# Patient Record
Sex: FEMALE | HISPANIC OR LATINO
[De-identification: names, ages, dates, MRNs, and addresses within clinical notes are randomized per-mention and may not be internally consistent; named-entity substitution may affect disease eponyms.]

---

## 2017-03-13 ENCOUNTER — CHARTING TRANS (OUTPATIENT)
Dept: OTHER | Age: 42
End: 2017-03-13

## 2017-03-21 ENCOUNTER — IMAGING SERVICES (OUTPATIENT)
Dept: OTHER | Age: 42
End: 2017-03-21

## 2017-03-21 ENCOUNTER — CHARTING TRANS (OUTPATIENT)
Dept: OTHER | Age: 42
End: 2017-03-21

## 2017-03-21 ENCOUNTER — LAB SERVICES (OUTPATIENT)
Dept: OTHER | Age: 42
End: 2017-03-21

## 2017-03-21 ENCOUNTER — HOSPITAL (OUTPATIENT)
Dept: OTHER | Age: 42
End: 2017-03-21
Attending: INTERNAL MEDICINE

## 2017-03-21 ASSESSMENT — PAIN SCALES - GENERAL: PAINLEVEL_OUTOF10: 0

## 2017-03-22 ENCOUNTER — CHARTING TRANS (OUTPATIENT)
Dept: OTHER | Age: 42
End: 2017-03-22

## 2017-03-24 ENCOUNTER — CHARTING TRANS (OUTPATIENT)
Dept: OTHER | Age: 42
End: 2017-03-24

## 2017-03-24 LAB — PAP WITH HIGH RISK HPV: NORMAL

## 2017-03-31 ENCOUNTER — LAB SERVICES (OUTPATIENT)
Dept: OTHER | Age: 42
End: 2017-03-31

## 2017-04-05 ENCOUNTER — CHARTING TRANS (OUTPATIENT)
Dept: OTHER | Age: 42
End: 2017-04-05

## 2017-04-05 LAB
ALBUMIN SERPL-MCNC: 4.1 G/DL (ref 3.6–5.1)
ALBUMIN/GLOB SERPL: 1.2 (ref 1–2.4)
ALP SERPL-CCNC: 80 UNITS/L (ref 45–117)
ALT SERPL-CCNC: 23 UNITS/L
ANION GAP SERPL CALC-SCNC: 15 MMOL/L (ref 10–20)
AST SERPL-CCNC: 17 UNITS/L
BASOPHILS # BLD: 0 K/MCL (ref 0–0.3)
BASOPHILS NFR BLD: 1 %
BILIRUB SERPL-MCNC: 0.3 MG/DL (ref 0.2–1)
BUN SERPL-MCNC: 18 MG/DL (ref 6–20)
BUN/CREAT SERPL: 29 (ref 7–25)
C TRACH RRNA SPEC QL NAA+PROBE: NEGATIVE
CALCIUM SERPL-MCNC: 8.8 MG/DL (ref 8.4–10.2)
CHLORIDE SERPL-SCNC: 108 MMOL/L (ref 98–107)
CHOLEST SERPL-MCNC: 167 MG/DL
CHOLEST/HDLC SERPL: 2.8
CO2 SERPL-SCNC: 24 MMOL/L (ref 21–32)
CREAT SERPL-MCNC: 0.62 MG/DL (ref 0.51–0.95)
DIFFERENTIAL METHOD BLD: ABNORMAL
EOSINOPHIL # BLD: 0.2 K/MCL (ref 0.1–0.5)
EOSINOPHIL NFR BLD: 4 %
ERYTHROCYTE [DISTWIDTH] IN BLOOD: 13.2 % (ref 11–15)
GLOBULIN SER-MCNC: 3.5 G/DL (ref 2–4)
GLUCOSE SERPL-MCNC: 66 MG/DL (ref 65–99)
HDLC SERPL-MCNC: 60 MG/DL
HEMATOCRIT: 39.7 % (ref 36–46.5)
HEMOGLOBIN: 12.8 G/DL (ref 12–15.5)
HIV 1+2 AB+HIV1 P24 AG SERPL QL IA: NONREACTIVE
LDLC SERPL CALC-MCNC: 90 MG/DL
LENGTH OF FAST TIME PATIENT: ABNORMAL HRS
LENGTH OF FAST TIME PATIENT: ABNORMAL HRS
LYMPHOCYTES # BLD: 1.5 K/MCL (ref 1–4.8)
LYMPHOCYTES NFR BLD: 26 %
MEAN CORPUSCULAR HEMOGLOBIN: 29.6 PG (ref 26–34)
MEAN CORPUSCULAR HGB CONC: 32.2 G/DL (ref 32–36.5)
MEAN CORPUSCULAR VOLUME: 91.9 FL (ref 78–100)
MONOCYTES # BLD: 0.5 K/MCL (ref 0.3–0.9)
MONOCYTES NFR BLD: 9 %
N GONORRHOEA RRNA SPEC QL NAA+PROBE: NEGATIVE
NEUTROPHILS # BLD: 3.7 K/MCL (ref 1.8–7.7)
NEUTROPHILS NFR BLD: 60 %
NONHDLC SERPL-MCNC: 107 MG/DL
PLATELET COUNT: 135 K/MCL (ref 140–450)
POTASSIUM SERPL-SCNC: 4.3 MMOL/L (ref 3.4–5.1)
RED CELL COUNT: 4.32 MIL/MCL (ref 4–5.2)
RPR SER QL: NONREACTIVE
SODIUM SERPL-SCNC: 143 MMOL/L (ref 135–145)
SPECIMEN SOURCE: NORMAL
TOTAL PROTEIN: 7.6 G/DL (ref 6.4–8.2)
TRIGL SERPL-MCNC: 83 MG/DL
TSH SERPL-ACNC: 2.17 MCUNITS/ML (ref 0.35–5)
WHITE BLOOD COUNT: 6 K/MCL (ref 4.2–11)

## 2018-04-12 ENCOUNTER — CHARTING TRANS (OUTPATIENT)
Dept: OTHER | Age: 43
End: 2018-04-12

## 2018-04-12 ENCOUNTER — IMAGING SERVICES (OUTPATIENT)
Dept: OTHER | Age: 43
End: 2018-04-12

## 2018-04-12 ENCOUNTER — HOSPITAL (OUTPATIENT)
Dept: OTHER | Age: 43
End: 2018-04-12
Attending: INTERNAL MEDICINE

## 2018-04-25 ENCOUNTER — HOSPITAL (OUTPATIENT)
Dept: OTHER | Age: 43
End: 2018-04-25
Attending: INTERNAL MEDICINE

## 2018-04-25 ENCOUNTER — IMAGING SERVICES (OUTPATIENT)
Dept: OTHER | Age: 43
End: 2018-04-25

## 2018-11-05 VITALS
HEIGHT: 60 IN | DIASTOLIC BLOOD PRESSURE: 70 MMHG | OXYGEN SATURATION: 100 % | HEART RATE: 75 BPM | WEIGHT: 146.83 LBS | TEMPERATURE: 97.5 F | SYSTOLIC BLOOD PRESSURE: 104 MMHG | RESPIRATION RATE: 16 BRPM | BODY MASS INDEX: 28.83 KG/M2

## 2018-11-05 VITALS
SYSTOLIC BLOOD PRESSURE: 120 MMHG | DIASTOLIC BLOOD PRESSURE: 82 MMHG | BODY MASS INDEX: 29 KG/M2 | HEART RATE: 76 BPM | RESPIRATION RATE: 16 BRPM | HEIGHT: 60 IN | OXYGEN SATURATION: 97 % | TEMPERATURE: 97.7 F | WEIGHT: 147.71 LBS

## 2023-07-10 ENCOUNTER — APPOINTMENT (OUTPATIENT)
Dept: CT IMAGING | Facility: HOSPITAL | Age: 48
End: 2023-07-10
Attending: NURSE PRACTITIONER
Payer: COMMERCIAL

## 2023-07-10 ENCOUNTER — HOSPITAL ENCOUNTER (EMERGENCY)
Facility: HOSPITAL | Age: 48
Discharge: HOME | End: 2023-07-11
Payer: COMMERCIAL

## 2023-07-10 DIAGNOSIS — G44.209 ACUTE NON INTRACTABLE TENSION-TYPE HEADACHE: Primary | ICD-10-CM

## 2023-07-10 DIAGNOSIS — H60.501 ACUTE OTITIS EXTERNA OF RIGHT EAR, UNSPECIFIED TYPE: ICD-10-CM

## 2023-07-10 LAB
ANION GAP SERPL CALC-SCNC: 8 MMOL/L (ref 0–18)
BASOPHILS # BLD AUTO: 0.04 X10(3) UL (ref 0–0.2)
BASOPHILS NFR BLD AUTO: 0.4 %
BUN BLD-MCNC: 13 MG/DL (ref 7–18)
BUN/CREAT SERPL: 16.5 (ref 10–20)
CALCIUM BLD-MCNC: 9.6 MG/DL (ref 8.5–10.1)
CHLORIDE SERPL-SCNC: 107 MMOL/L (ref 98–112)
CO2 SERPL-SCNC: 27 MMOL/L (ref 21–32)
CREAT BLD-MCNC: 0.79 MG/DL
DEPRECATED RDW RBC AUTO: 43.6 FL (ref 35.1–46.3)
EOSINOPHIL # BLD AUTO: 0.24 X10(3) UL (ref 0–0.7)
EOSINOPHIL NFR BLD AUTO: 2.1 %
ERYTHROCYTE [DISTWIDTH] IN BLOOD BY AUTOMATED COUNT: 13 % (ref 11–15)
GFR SERPLBLD BASED ON 1.73 SQ M-ARVRAT: 92 ML/MIN/1.73M2 (ref 60–?)
GLUCOSE BLD-MCNC: 89 MG/DL (ref 70–99)
HCT VFR BLD AUTO: 42.5 %
HGB BLD-MCNC: 13.9 G/DL
IMM GRANULOCYTES # BLD AUTO: 0.05 X10(3) UL (ref 0–1)
IMM GRANULOCYTES NFR BLD: 0.4 %
LYMPHOCYTES # BLD AUTO: 1.9 X10(3) UL (ref 1–4)
LYMPHOCYTES NFR BLD AUTO: 16.9 %
MCH RBC QN AUTO: 30.1 PG (ref 26–34)
MCHC RBC AUTO-ENTMCNC: 32.7 G/DL (ref 31–37)
MCV RBC AUTO: 92 FL
MONOCYTES # BLD AUTO: 0.79 X10(3) UL (ref 0.1–1)
MONOCYTES NFR BLD AUTO: 7 %
NEUTROPHILS # BLD AUTO: 8.23 X10 (3) UL (ref 1.5–7.7)
NEUTROPHILS # BLD AUTO: 8.23 X10(3) UL (ref 1.5–7.7)
NEUTROPHILS NFR BLD AUTO: 73.2 %
OSMOLALITY SERPL CALC.SUM OF ELEC: 294 MOSM/KG (ref 275–295)
PLATELET # BLD AUTO: 167 10(3)UL (ref 150–450)
POTASSIUM SERPL-SCNC: 4.1 MMOL/L (ref 3.5–5.1)
RBC # BLD AUTO: 4.62 X10(6)UL
SODIUM SERPL-SCNC: 142 MMOL/L (ref 136–145)
WBC # BLD AUTO: 11.3 X10(3) UL (ref 4–11)

## 2023-07-10 PROCEDURE — 96375 TX/PRO/DX INJ NEW DRUG ADDON: CPT

## 2023-07-10 PROCEDURE — 99284 EMERGENCY DEPT VISIT MOD MDM: CPT

## 2023-07-10 PROCEDURE — 96374 THER/PROPH/DIAG INJ IV PUSH: CPT

## 2023-07-10 PROCEDURE — 96361 HYDRATE IV INFUSION ADD-ON: CPT

## 2023-07-10 PROCEDURE — 80048 BASIC METABOLIC PNL TOTAL CA: CPT | Performed by: NURSE PRACTITIONER

## 2023-07-10 PROCEDURE — 85025 COMPLETE CBC W/AUTO DIFF WBC: CPT | Performed by: NURSE PRACTITIONER

## 2023-07-10 RX ORDER — METOCLOPRAMIDE HYDROCHLORIDE 5 MG/ML
10 INJECTION INTRAMUSCULAR; INTRAVENOUS ONCE
Status: COMPLETED | OUTPATIENT
Start: 2023-07-10 | End: 2023-07-10

## 2023-07-10 RX ORDER — KETOROLAC TROMETHAMINE 15 MG/ML
15 INJECTION, SOLUTION INTRAMUSCULAR; INTRAVENOUS ONCE
Status: COMPLETED | OUTPATIENT
Start: 2023-07-10 | End: 2023-07-10

## 2023-07-10 RX ORDER — DIPHENHYDRAMINE HYDROCHLORIDE 50 MG/ML
25 INJECTION INTRAMUSCULAR; INTRAVENOUS ONCE
Status: COMPLETED | OUTPATIENT
Start: 2023-07-10 | End: 2023-07-10

## 2023-07-11 ENCOUNTER — APPOINTMENT (OUTPATIENT)
Dept: CT IMAGING | Facility: HOSPITAL | Age: 48
End: 2023-07-11
Attending: NURSE PRACTITIONER
Payer: COMMERCIAL

## 2023-07-11 VITALS
BODY MASS INDEX: 28.86 KG/M2 | HEART RATE: 92 BPM | OXYGEN SATURATION: 100 % | TEMPERATURE: 98 F | WEIGHT: 147 LBS | HEIGHT: 60 IN | DIASTOLIC BLOOD PRESSURE: 69 MMHG | RESPIRATION RATE: 17 BRPM | SYSTOLIC BLOOD PRESSURE: 103 MMHG

## 2023-07-11 PROCEDURE — 70450 CT HEAD/BRAIN W/O DYE: CPT | Performed by: NURSE PRACTITIONER

## 2023-07-11 RX ORDER — CIPROFLOXACIN AND DEXAMETHASONE 3; 1 MG/ML; MG/ML
4 SUSPENSION/ DROPS AURICULAR (OTIC) 2 TIMES DAILY
Qty: 7.5 ML | Refills: 0 | Status: SHIPPED | OUTPATIENT
Start: 2023-07-11

## 2023-07-11 NOTE — ED INITIAL ASSESSMENT (HPI)
Pt reports last Thursday waking up with right sided face pain. Pt reports developing right sided neck pain on Friday. pt reports cough on Saturday. Pt reports hearing loss to right ear on Sunday. Pt reports intermittent sharp, stabbing pain to right ear and radiates up to right side of head.

## 2023-07-11 NOTE — ED QUICK NOTES
Patient resting on cart, non-labored respirations, endorses waves of sharp pains to right ear.  +subjective fevers, no ear drainage

## 2024-03-11 ENCOUNTER — APPOINTMENT (OUTPATIENT)
Dept: GENERAL RADIOLOGY | Age: 49
End: 2024-03-11
Attending: PHYSICIAN ASSISTANT
Payer: COMMERCIAL

## 2024-03-11 ENCOUNTER — HOSPITAL ENCOUNTER (OUTPATIENT)
Age: 49
Discharge: HOME | End: 2024-03-11
Payer: COMMERCIAL

## 2024-03-11 VITALS
DIASTOLIC BLOOD PRESSURE: 75 MMHG | HEART RATE: 90 BPM | OXYGEN SATURATION: 100 % | TEMPERATURE: 98 F | RESPIRATION RATE: 16 BRPM | SYSTOLIC BLOOD PRESSURE: 108 MMHG

## 2024-03-11 DIAGNOSIS — L02.212 ABSCESS OF BACK: ICD-10-CM

## 2024-03-11 DIAGNOSIS — M25.562 ARTHRALGIA OF LEFT LOWER LEG: Primary | ICD-10-CM

## 2024-03-11 PROCEDURE — 99213 OFFICE O/P EST LOW 20 MIN: CPT | Performed by: PHYSICIAN ASSISTANT

## 2024-03-11 PROCEDURE — 73562 X-RAY EXAM OF KNEE 3: CPT | Performed by: PHYSICIAN ASSISTANT

## 2024-03-11 RX ORDER — LIDOCAINE HYDROCHLORIDE 10 MG/ML
5 INJECTION, SOLUTION EPIDURAL; INFILTRATION; INTRACAUDAL; PERINEURAL ONCE
Status: DISCONTINUED | OUTPATIENT
Start: 2024-03-11 | End: 2024-03-11

## 2024-03-11 RX ORDER — LIDOCAINE HYDROCHLORIDE AND EPINEPHRINE 10; 10 MG/ML; UG/ML
1 INJECTION, SOLUTION INFILTRATION; PERINEURAL ONCE
Status: COMPLETED | OUTPATIENT
Start: 2024-03-11 | End: 2024-03-11

## 2024-03-11 RX ORDER — SULFAMETHOXAZOLE AND TRIMETHOPRIM 800; 160 MG/1; MG/1
1 TABLET ORAL 2 TIMES DAILY
Qty: 14 TABLET | Refills: 0 | Status: SHIPPED | OUTPATIENT
Start: 2024-03-11 | End: 2024-03-18

## 2024-03-11 NOTE — ED PROVIDER NOTES
Patient Seen in: [facility]      History     Chief Complaint   Patient presents with    Abscess    Knee Pain     Stated Complaint: rib pain, knee pain    Subjective:   HPI    Patient is a healthy 48-year-old female that presents to immediate care due to painful lesion on her torso worsening over the past week.  Patient states that she noticed a \"bump\" over her right back 1 week ago.  States that he she has had increased pain and swelling of the area.  Notes increased overlying erythema and pain which prompted patient to come to immediate care for further evaluation.  Patient also notes that she has left knee pain.  States that she was recently moving and believes that she twisted her knee.  Pain worse with range of motion and ambulation.  Denies treatment prior to arrival.    Objective:   History reviewed. No pertinent past medical history.           History reviewed. No pertinent surgical history.             Social History     Socioeconomic History    Marital status: Single   Tobacco Use    Smoking status: Never    Smokeless tobacco: Never   Vaping Use    Vaping Use: Never used   Substance and Sexual Activity    Alcohol use: Never    Drug use: Never              Review of Systems    Positive for stated complaint: rib pain, knee pain  Other systems are as noted in HPI.  Constitutional and vital signs reviewed.      All other systems reviewed and negative except as noted above.    Physical Exam     ED Triage Vitals [03/11/24 1722]   /75   Pulse 90   Resp 16   Temp 98.3 °F (36.8 °C)   Temp src Oral   SpO2 100 %   O2 Device None (Room air)       Current:/75   Pulse 90   Temp 98.3 °F (36.8 °C) (Oral)   Resp 16   SpO2 100%         Physical Exam    Vital signs reviewed. Nursing note reviewed.  Constitutional: Well-developed. Well-nourished. In no acute distress  HENT: Mucous membranes moist.   EYES: No scleral icterus or conjunctival injection.  NECK: Full ROM. Supple.   CARDIAC: Normal  rate. Normal S1/ S2. 2+ distal pulses. No edema  PULM/CHEST: Clear to auscultation bilaterally. No wheezes  Extremities: Full ROM of left knee.  Increased pain with flexion.  Point tenderness to palpation of left meniscus of left knee.  No overlying edema ecchymosis lacerations warmth erythema.  NEURO: Awake, alert, following commands, moving extremities, answering questions.   SKIN: Warm and dry.  3 cm circular fluctuant mass of right lumbar back.  Mild tenderness, overlying warmth erythema.  No active bleeding discharge surrounding erythema.  PSYCH: Normal judgment. Normal affect.               MDM      Patient is a healthy 48-year-old female that presents to immediate care due to painful lesion of right lower back worsening over the past week and left knee pain over the past few days.  Patient arrives with stable vitals, sitting comfortably.  Physical exam showing point tenderness ovation of left meniscus of left knee with increased pain with flexion of left knee.  Most likely meniscus injury however will obtain extremities to evaluate for acute knee fracture, dislocation.  Additionally, patient presents with fluctuant 3 cm circular mass to  right lower back.  Most likely cyst versus abscess, anesthesia was obtained using 1% with epi lidocaine.  Skin was cleaned with iodine.  Incision performed, simple incision, using 11 blade.  Scant purulent drainage was removed.  Patient tolerated procedure well, noting significant improvement in pain, with no immediate complications.  Due to faint surrounding erythema plan to discharge home with Bactrim for possible secondary cellulitis.  History given by patient.        ED Course   Labs Reviewed - No data to display     6:24 PM  X-ray images discussed with patient showing small suprapatellar joint effusion.  Discussed patient possible meniscus injury, will provide orthopedic referral at time of discharge.  Will additionally provide general surgery referral if abscess  persists or worsens.  Patient also requesting PCP referral stating that she does not currently have 1.  Will provide patient PCP referral at time of discharge.  Return precautions including worsening pain, increased erythema pain.  Patient agreeable to plan.                                  Medical Decision Making      Disposition and Plan     Clinical Impression:  1. Arthralgia of left lower leg    2. Abscess of back         Disposition:  Discharge  3/11/2024  6:14 pm    Follow-up:  Olaf Erazo MD  [address] [facility]  [address]  [facility] [address] [phone]    Schedule an appointment as soon as possible for a visit   for your back abscess    Bruce Trotter MD  [address] [facility]  [address]  [facility] [address] [phone]    Schedule an appointment as soon as possible for a visit   in your knee continues to hurt    Joie Ayala MD  [address] [facility]  [address]  [place] [address] [phone]    Schedule an appointment as soon as possible for a visit   for a primary care doctor          Medications Prescribed:  Discharge Medication List as of 3/11/2024  6:15 PM        START taking these medications    Details   sulfamethoxazole-trimethoprim -160 MG Oral Tab per tablet Take 1 tablet by mouth 2 (two) times daily for 7 days., Normal, Disp-14 tablet, R-0

## 2024-03-11 NOTE — ED INITIAL ASSESSMENT (HPI)
Pt with a area of swelling to her right flank. Began as a small bump x3 weeks ago, increased in size over past week. Approx 1\" round. Denies drainage, fever, trauma. Painful to touch.     Pt with c/o left knee since Saturday after moving boxes. Able to ambulate but c/o pain with movement. Denies specific incident that caused the pain.

## 2024-06-07 ENCOUNTER — HOSPITAL ENCOUNTER (OUTPATIENT)
Age: 49
Discharge: HOME | End: 2024-06-07
Payer: COMMERCIAL

## 2024-06-07 VITALS
TEMPERATURE: 98 F | OXYGEN SATURATION: 99 % | SYSTOLIC BLOOD PRESSURE: 129 MMHG | DIASTOLIC BLOOD PRESSURE: 91 MMHG | HEART RATE: 85 BPM | RESPIRATION RATE: 18 BRPM

## 2024-06-07 DIAGNOSIS — L02.91 ABSCESS: Primary | ICD-10-CM

## 2024-06-07 PROCEDURE — 99213 OFFICE O/P EST LOW 20 MIN: CPT | Performed by: NURSE PRACTITIONER

## 2024-06-07 RX ORDER — DOXYCYCLINE HYCLATE 100 MG/1
100 CAPSULE ORAL 2 TIMES DAILY
Qty: 14 CAPSULE | Refills: 0 | Status: SHIPPED | OUTPATIENT
Start: 2024-06-07 | End: 2024-06-14

## 2024-06-07 NOTE — ED PROVIDER NOTES
Patient Seen in: [facility] [place]    History   CC: abscesses   HPI: Sheryl Maloney 49 year old female  who presents c/o multiple clustered upper back abscesses.  States a few months ago had a similar lesion that was incised and drained with improvement however states in the last couple weeks has had a few of the same lesions abrupt. States her largest one open yesterday spontaneously and has been draining. Denies fever. Denies lesions elsewhere.     No past medical history on file.    No past surgical history on file.    No family history on file.    Social History     Socioeconomic History    Marital status: Single   Tobacco Use    Smoking status: Never    Smokeless tobacco: Never   Vaping Use    Vaping status: Never Used   Substance and Sexual Activity    Alcohol use: Never    Drug use: Never     Social Determinants of Health     Food Insecurity: Low Risk  (2/8/2023)    Received from [facility]    Food Insecurity     Have there been times that your food ran out, and you didn't have money to get more?: No     Are there times that you worry that this might happen?: No   Transportation Needs: Low Risk  (2/8/2023)    Received from [facility]    Transportation Needs     Do you have trouble getting transportation to medical appointments?: No       ROS:  Review of Systems    Positive for stated complaint: Right Arm Pain  Other systems are as noted in HPI.  Constitutional and vital signs reviewed.      All other systems reviewed and negative except as noted above.    PSFH elements reviewed from today and agreed except as otherwise stated in HPI.             Constitutional and vital signs reviewed.        Physical Exam     ED Triage Vitals [06/07/24 0807]   BP (!) 129/91   Pulse 85   Resp 18   Temp 98 °F (36.7 °C)   Temp src Temporal   SpO2 99 %   O2 Device None (Room air)       Current:BP (!) 129/91    Pulse 85   Temp 98 °F (36.7 °C) (Temporal)   Resp 18   SpO2 99%         PE:  General - Appears well, non-toxic and in NAD  Head - Appears symmetrical without deformity/swelling cranium, scalp, or facial bones  Eyes -  sclera not injected, no discharge noted, no periorbital edema  Skin - +3 small non-fluctuant lesions noted to right upper back just posterior to axilla without active dx. +3 small non fluctuant lesions noted to left upper back just posterior to axilla without dx. No surrounding erythema or edema associated.  Skin is otherwise pink warm and dry throughout, mmm, cap refill <2seconds  Neuro - A&O x4, sensation equal to both medial and lateral aspects of extremities, steady gait  MSK - makes purposeful movements of all extremities, radial pulses 2+ bilat.  Psych - Interactive and appropriate      ED Course   Labs Reviewed - No data to display    MDM     DDx: Abscess versus cellulitis versus cyst    We will treat  or abscess with doxycycline.  Warm/moist compresses or soaks reviewed, Tylenol or Motrin as needed for discomfort, follow-up and return/ED precautions reviewed.  Patient is historian and demonstrates understanding of all instruction and agrees with plan of care.      Disposition and Plan     Clinical Impression:  1. Abscess        Disposition:  Discharge    Follow-up:  Jimbo Macias MD  [address] [facility]  [address]  [facility] [address] [phone]    Go in 3 days  As needed    Katt Sun MD  [address] [place] [address] [phone]    Go in 3 days  As needed      Medications Prescribed:  Discharge Medication List as of 6/7/2024  8:15 AM        START taking these medications    Details   doxycycline 100 MG Oral Cap Take 1 capsule (100 mg total) by mouth 2 (two) times daily for 7 days., Normal, Disp-14 capsule, R-0

## 2024-06-07 NOTE — ED INITIAL ASSESSMENT (HPI)
Abscess on the  right upper arm 2 weeks ago with swelling.  Today while in shower noted that \"it open up'.

## 2024-06-07 NOTE — DISCHARGE INSTRUCTIONS
Take the full course of antibiotics, even if you start to feel better. Make an appointment to follow up with your primary care doctor dermatologist as provided. Use warm soaks/moist compresses and massage to drain area further. Good hand washing is important and anything that drains from the abscess should be considered contagious. Seek additional care in the emergency department if you have increasing redness, warmth or tenderness around your wound, fever > 100.4, nausea/vomiting or diarrhea, or for any other concerns.  
no

## 2024-10-31 ENCOUNTER — APPOINTMENT (OUTPATIENT)
Dept: GENERAL RADIOLOGY | Age: 49
End: 2024-10-31
Attending: Physician Assistant
Payer: COMMERCIAL

## 2024-10-31 ENCOUNTER — HOSPITAL ENCOUNTER (OUTPATIENT)
Age: 49
Discharge: HOME | End: 2024-10-31
Payer: COMMERCIAL

## 2024-10-31 VITALS
TEMPERATURE: 98 F | DIASTOLIC BLOOD PRESSURE: 57 MMHG | RESPIRATION RATE: 18 BRPM | OXYGEN SATURATION: 99 % | SYSTOLIC BLOOD PRESSURE: 114 MMHG | HEART RATE: 96 BPM

## 2024-10-31 DIAGNOSIS — M25.551 RIGHT HIP PAIN: ICD-10-CM

## 2024-10-31 DIAGNOSIS — M54.40 BACK PAIN OF LUMBAR REGION WITH SCIATICA: Primary | ICD-10-CM

## 2024-10-31 PROCEDURE — 99214 OFFICE O/P EST MOD 30 MIN: CPT | Performed by: PHYSICIAN ASSISTANT

## 2024-10-31 PROCEDURE — 96372 THER/PROPH/DIAG INJ SC/IM: CPT | Performed by: PHYSICIAN ASSISTANT

## 2024-10-31 PROCEDURE — 72100 X-RAY EXAM L-S SPINE 2/3 VWS: CPT | Performed by: PHYSICIAN ASSISTANT

## 2024-10-31 PROCEDURE — 73502 X-RAY EXAM HIP UNI 2-3 VIEWS: CPT | Performed by: PHYSICIAN ASSISTANT

## 2024-10-31 RX ORDER — METHYLPREDNISOLONE 4 MG/1
TABLET ORAL
Qty: 1 EACH | Refills: 0 | Status: SHIPPED | OUTPATIENT
Start: 2024-10-31

## 2024-10-31 RX ORDER — KETOROLAC TROMETHAMINE 30 MG/ML
30 INJECTION, SOLUTION INTRAMUSCULAR; INTRAVENOUS ONCE
Status: COMPLETED | OUTPATIENT
Start: 2024-10-31 | End: 2024-10-31

## 2024-10-31 RX ORDER — CYCLOBENZAPRINE HCL 5 MG
5 TABLET ORAL 3 TIMES DAILY PRN
Qty: 12 TABLET | Refills: 0 | Status: SHIPPED | OUTPATIENT
Start: 2024-10-31

## 2024-10-31 RX ORDER — LIDOCAINE 50 MG/G
1 PATCH TOPICAL EVERY 24 HOURS
Qty: 15 PATCH | Refills: 0 | Status: SHIPPED | OUTPATIENT
Start: 2024-10-31 | End: 2024-11-15

## 2024-10-31 NOTE — ED INITIAL ASSESSMENT (HPI)
Pt states she hit her right shin on the open door of a .  +pain from the right hip down to the knee.  Pt heard 3 pops.

## 2024-10-31 NOTE — DISCHARGE INSTRUCTIONS
Alternate ice / heat as tolerated  Get plenty of rest and drink plenty of water   Complete entire steroid pack as directed   Cyclobenzaprine (muscle relaxer) for spasms as needed. DO NOT drive or operate machinery after taking   Apply lidocaine patch directly to skin as needed for pain. You may leave on for up to 12 hours at a time and then 12 hours off at a time  Avoid excessive twisting / bending / lifting  Expect symptoms to start improving over next few days    Follow up with your primary care provider and/or ortho

## 2024-10-31 NOTE — ED PROVIDER NOTES
Chief Complaint   Patient presents with    Hip Pain       History obtained from: patient   services not used     HPI:     Sheryl Maloney is a 49 year old female who presents with lower back and right hip pain following injury this morning around 5:30 AM.  Patient states she tripped on open  at home. Patient states she hit her right shin on  door then immediately felt pain in her lower back and right hip. Patient states she heard \"crack\" in hip. Patient endorses pain in lower back, right hip, and radiating down posterior right upper leg.  Patient states she has no pain in knee or shin.  No medications taken or treatments tried.  Denies fall, head injury, neck pain, numbness, weakness, bowel/bladder dysfunction, saddle anesthesia, wound.    PMH  History reviewed. No pertinent past medical history.    [facility] and [facility].  Nurses notes reviewed I agree with documentation.    History reviewed. No pertinent family history.  Family history reviewed with patient/caregiver and is not pertinent to presenting problem.  Social History     Socioeconomic History    Marital status: Single     Spouse name: Not on file    Number of children: Not on file    Years of education: Not on file    Highest education level: Not on file   Occupational History    Not on file   Tobacco Use    Smoking status: Never    Smokeless tobacco: Never   Vaping Use    Vaping status: Never Used   Substance and Sexual Activity    Alcohol use: Never    Drug use: Never    Sexual activity: Not on file   Other Topics Concern    Not on file   Social History Narrative    Not on file     Social Drivers of Health     Financial Resource Strain: Not on file   Food Insecurity: Low Risk  (2/8/2023)    [facility]    Food Insecurity     Have there been times that your food ran out, and you didn't have money to get more?: No     Are there times  that you worry that this might happen?: No   Transportation Needs: Low Risk  (2/8/2023)    [facility]    Transportation Needs     Do you have trouble getting transportation to medical appointments?: No     How do you normally get to and from your appointments?: Not on file   Physical Activity: Not on file   Stress: Not on file   Social Connections: Not on file   Housing Stability: Not on file (2/8/2023)         ROS:   Positive for stated complaint: low back pain, right hip pain   All other systems reviewed and negative except as noted above.  Constitutional and Vital Signs Reviewed.    Physical Exam:     Findings:    /57   Pulse 96   Temp 97.8 °F (36.6 °C) (Temporal)   Resp 18   SpO2 99%   GENERAL: well developed, no acute distress, non-toxic appearing   SKIN: good skin turgor, no obvious rashes  HEAD: normocephalic, atraumatic  EYES: sclera non-icteric bilaterally, conjunctiva clear bilaterally  OROPHARYNX: MMM, maintaining airway and secretions  NECK: no nuchal rigidity, no trismus, no edema, phonation normal    CARDIO: tachycardic, regular rhythm, DP pulse 2+ bilaterally, cap refill < 2 sec   LUNGS: no increased WOB  GI: abdomen soft and non-tender   BACK: Right paraspinal tenderness to lumbar region and into right buttock, no midline tenderness to spine, no step-offs or deformities to spine, skin intact without overlying changes  EXTREMITIES: Tenderness to posterior, lateral, and anterior right hip, no obvious swelling or deformity, ROM somewhat limited due to pain, no tenderness to remainder of RLE, compartments soft, CMS intact   NEURO: no focal deficits, BLE strength 5/5, sensation intact, no saddle anesthesia   PSYCH: alert and oriented x3, answering questions appropriately, mood appropriate    MDM/Assessment/Plan:   Orders for this encounter:    Orders Placed This Encounter    XR LUMBAR SPINE (MIN 2 VIEWS) (CPT=72100)     Order  Specific Question:   What is the Relevant Clinical Indication / Reason for Exam?     Answer:   right lower back and hip pain following injury this morning     Order Specific Question:   Release to patient     Answer:   Immediate    XR HIP W OR WO PELVIS 2 OR 3 VIEWS, RIGHT (CPT=73502)     Order Specific Question:   What is the Relevant Clinical Indication / Reason for Exam?     Answer:   right lower back and hip pain following injury this morning     Order Specific Question:   Release to patient     Answer:   Immediate    ketorolac (Toradol) 30 MG/ML injection 30 mg    lidocaine-menthol 4-1 % patch 1 patch     Order Specific Question:   Please indicate site of application     Answer:   Right lower back    methylPREDNISolone (MEDROL) 4 MG Oral Tablet Therapy Pack     Sig: Dosepack: take as directed     Dispense:  1 each     Refill:  0    cyclobenzaprine 5 MG Oral Tab     Sig: Take 1 tablet (5 mg total) by mouth 3 (three) times daily as needed for Muscle spasms.     Dispense:  12 tablet     Refill:  0    lidocaine 5 % External Patch     Sig: Place 1 patch onto the skin daily for 15 days.     Dispense:  15 patch     Refill:  0       Labs performed this visit:  No results found for this or any previous visit (from the past 10 hours).    Imaging performed this visit:  XR LUMBAR SPINE (MIN 2 VIEWS) (CPT=72100)   Final Result   PROCEDURE: XR LUMBAR SPINE (MIN 2 VIEWS) (CPT=72100)       COMPARISON: None.       INDICATIONS: Right lower back and hip pain following post fall today       TECHNIQUE: Lumbar spine radiographs (2-3 views)         FINDINGS:        Bone mineralization is normal.       There are 5 lumbar type vertebral bodies in gross alignment with no acute    fracture/traumatic subluxation.       There are slight degenerative changes within the lower thoracic and lumbar    spine manifested by minimal bony hypertrophy, endplate sclerosis, and    facet hypertrophy.                   =====   CONCLUSION:        1. No  acute fracture/traumatic subluxation.       2. Slight degenerative changes within the lumbar spine.               Dictated by (CST): Edi Clark MD on 10/31/2024 at 11:46 AM        Finalized by (CST): Edi Clark MD on 10/31/2024 at 11:47 AM               XR HIP W OR WO PELVIS 2 OR 3 VIEWS, RIGHT (CPT=73502)   Final Result   PROCEDURE: XR HIP W OR WO PELVIS 2 OR 3 VIEWS, RIGHT (CPT=73502)       COMPARISON: None.       INDICATIONS: Right lower back and hip pain following post fall today       TECHNIQUE: 3 views were obtained.         FINDINGS:    Bone mineralization is normal.       There is no acute fracture/dislocation.       There are slight symmetric degenerative changes within both hips    manifested by slight articular space narrowing and subarticular sclerosis.       There are rounded calcifications within the left hemipelvis likely    representing calcified fibroids.                   =====   CONCLUSION:        1. No acute fracture/dislocation.       2. Slight symmetric degenerative changes within both hips.                 Dictated by (CST): Edi Clark MD on 10/31/2024 at 11:45 AM        Finalized by (CST): Edi Clark MD on 10/31/2024 at 11:46 AM                   Medical Decision Making  DDx includes muscle spasm versus muscle strain versus contusion versus fracture versus dislocation versus sciatica versus piriformis syndrome versus other.  Patient appears uncomfortable due to pain in her back and hip and was found to be tachycardic upon initial presentation to .  Vitals are otherwise stable.  No deficits in strength or sensation.  No signs or symptoms of cauda equina or cord compression.  Will obtain x-rays of lumbar spine and right hip.  Patient given Toradol and lidocaine patch for pain.    X-rays reviewed, no evidence of acute osseous abnormality slight degenerative changes noted within the lumbar spine and hips.  On reassessment, patient is resting comfortably and appears much  more comfortable.  Patient states pain has improved.  Tachycardia resolved, vitals are stable.  No new or worsening symptoms throughout IC stay.  Patient voided without difficulty in IC.  Discussed x-ray results with patient who is reassured by these findings.  Discussed possible etiologies of symptoms with patient.  Rx Medrol Dosepak, Flexeril, lidocaine patch for symptomatic management.  Discussed medication safety and side effects.  Discussed supportive care including rest and alternating heat/ice to back and hip as tolerated.  Instructed patient to go directly to nearest ER with any worsening or concerning symptoms.  Follow-up with PCP and/or Ortho.  Work note provided.    Amount and/or Complexity of Data Reviewed  Radiology: ordered and independent interpretation performed.    Risk  OTC drugs.  Prescription drug management.          Diagnosis:    ICD-10-CM    1. Back pain of lumbar region with sciatica  M54.40 XR LUMBAR SPINE (MIN 2 VIEWS) (CPT=72100)     XR HIP W OR WO PELVIS 2 OR 3 VIEWS, RIGHT (CPT=73502)     XR LUMBAR SPINE (MIN 2 VIEWS) (CPT=72100)     XR HIP W OR WO PELVIS 2 OR 3 VIEWS, RIGHT (CPT=73502)      2. Right hip pain  M25.551 XR HIP W OR WO PELVIS 2 OR 3 VIEWS, RIGHT (CPT=73502)     XR HIP W OR WO PELVIS 2 OR 3 VIEWS, RIGHT (CPT=73502)          All results reviewed and discussed with patient/patient's family. Patient/patient's family verbalize excellent understanding of instructions and feels comfortable with plan. All of patient's/patient's family's questions were addressed.   See AVS for detailed discharge instructions for your condition today.    Follow Up with:  Bruce Trotter MD  [address] [facility] [address]  [facility] [address]  [phone]      Orthopedics      Note: This document was dictated using Dragon medical dictation software.  Proofreading was performed to the best of my ability, but errors may be present.    Abbi Iverson PA-C

## 2024-11-01 ENCOUNTER — HOSPITAL ENCOUNTER (EMERGENCY)
Facility: HOSPITAL | Age: 49
Discharge: HOME | End: 2024-11-01
Attending: EMERGENCY MEDICINE
Payer: COMMERCIAL

## 2024-11-01 VITALS
TEMPERATURE: 99 F | OXYGEN SATURATION: 98 % | HEART RATE: 75 BPM | BODY MASS INDEX: 28.07 KG/M2 | WEIGHT: 143 LBS | DIASTOLIC BLOOD PRESSURE: 69 MMHG | HEIGHT: 60 IN | RESPIRATION RATE: 18 BRPM | SYSTOLIC BLOOD PRESSURE: 111 MMHG

## 2024-11-01 DIAGNOSIS — M79.651 RIGHT THIGH PAIN: ICD-10-CM

## 2024-11-01 DIAGNOSIS — M79.18 RIGHT BUTTOCK PAIN: Primary | ICD-10-CM

## 2024-11-01 PROCEDURE — 99283 EMERGENCY DEPT VISIT LOW MDM: CPT

## 2024-11-01 PROCEDURE — 96372 THER/PROPH/DIAG INJ SC/IM: CPT

## 2024-11-01 RX ORDER — KETOROLAC TROMETHAMINE 30 MG/ML
30 INJECTION, SOLUTION INTRAMUSCULAR; INTRAVENOUS ONCE
Status: COMPLETED | OUTPATIENT
Start: 2024-11-01 | End: 2024-11-01

## 2024-11-01 RX ORDER — MELOXICAM 15 MG/1
15 TABLET ORAL DAILY
Qty: 14 TABLET | Refills: 0 | Status: SHIPPED | OUTPATIENT
Start: 2024-11-01 | End: 2024-11-15

## 2024-11-01 NOTE — ED INITIAL ASSESSMENT (HPI)
Patient reports tripping over open  yesterday, landing on right side. Patient went to IC after initial injury, have xray with  negative results. Was told it was sciatica but states pain continued today. Ambulatory in triage.

## 2024-11-01 NOTE — DISCHARGE INSTRUCTIONS
Use meloxicam daily as we discussed.  Your muscles will likely be sore for the next few days, start to walk slowly as tolerated.  If pain not resolving over the next 1 to 2 weeks please follow-up with orthopedics, return back to ER with worsening pain, leg weakness, unable to walk, any other concerns

## 2024-11-01 NOTE — ED PROVIDER NOTES
Patient Seen in: [facility] Emergency Department      History   No chief complaint on file.    Stated Complaint: Fall, Leg pain    Subjective:   HPI      49-year-old female presents with right buttock and hamstring pain.  Patient states yesterday she tripped over her  door, felt 3 pops right anterolateral hip area.  Today is having pain right buttock and proximal right posterior thigh.  Is having difficulty putting pressure on the area, but can ambulate, denies weakness of the foot, lower leg    Objective:     History reviewed. No pertinent past medical history.           History reviewed. No pertinent surgical history.             Social History     Socioeconomic History    Marital status: Single   Tobacco Use    Smoking status: Never    Smokeless tobacco: Never   Vaping Use    Vaping status: Never Used   Substance and Sexual Activity    Alcohol use: Never    Drug use: Never     Social Drivers of Health     Food Insecurity: Low Risk  (2/8/2023)    Received from [facility]    Food Insecurity     Have there been times that your food ran out, and you didn't have money to get more?: No     Are there times that you worry that this might happen?: No   Transportation Needs: Low Risk  (2/8/2023)    Received from [facility]    Transportation Needs     Do you have trouble getting transportation to medical appointments?: No                  Physical Exam     ED Triage Vitals [11/01/24 1700]   /73   Pulse 100   Resp 18   Temp 98.7 °F (37.1 °C)   Temp src    SpO2 98 %   O2 Device        Current Vitals:   Vital Signs  BP: 119/73  Pulse: 100  Resp: 18  Temp: 98.7 °F (37.1 °C)    Oxygen Therapy  SpO2: 98 %        Physical Exam  Vital signs reviewed. Nursing note reviewed.  Constitutional: Well-developed. Well-nourished. In no acute distress  HENT: Mucous membranes moist.   EYES: No scleral  icterus or conjunctival injection.  NECK: Full ROM. Supple.   CARDIAC: Normal rate.   PULM/CHEST: Non labored breathing  ABD: Non distended  RECTAL: deferred  Extremities: Normal internal and external rotation of right hip.  No bony tenderness of right thigh, right lower leg, right foot.  Tenderness without any obvious bruising right gluteal area, tenderness proximal posterior thigh  NEURO: Awake, alert, following commands, moving extremities, answering questions.  Normal plantarflexion and dorsiflexion of right foot, normal sensation  SKIN: Warm and dry. No rash or lesions.  PSYCH: Normal judgment. Normal affect.        ED Course   Labs Reviewed - No data to display                MDM      Assessment:Patient is a 49 year old female presenting to the ED due to right buttock and thigh pain.    Comorbidities/chronic illnesses impacting care: none    History obtained from: patient    Laboratory results above were independently viewed and interpreted by me.  External records and previous hospitalization records reviewed and documented below      Radiography/Imaging:    No orders to display     Imaging result above were independently viewed and interpreted by me.    Consideration of Social Determinants of Health and Impact on Medical Decision Making:  Housing/Transportation/Financial Strain/Access to healthcare/Food insecurity/family or Community support/Language and Literacy/Substance abuse/Mental health concerns/Disabilities     -none    ED course  Patient here with normal vitals and appears well.  She is laying on her left side as she cannot tolerate laying on her back due to her gluteal pain.  Was seen in urgent care yesterday, reviewed her right hip and lumbar spine x-rays which show no acute fracture.  Patient presenting today due to concern that she is not suffering from sciatica, but something else.  With her gluteal tenderness and sensation of popping, have high suspicion she pulled/tore musculature in right  gluteal/hip/hamstring area from the rapid change in direction associated with tripping over her open  door.  Feel this will be a tough area to apply an Ace bandage as discussed with her.  We discussed pain control at home including anti-inflammatories as she did not like the way her prescribed pain medication made her feel yesterday, return to activity as tolerated, orthopedic follow-up and possible advanced imaging if not healing as expected.            Medications   ketorolac (Toradol) 30 MG/ML injection 30 mg (30 mg Intramuscular Given 11/1/24 1816)                 Medical Decision Making      Disposition and Plan     Clinical Impression:  1. Right buttock pain    2. Right thigh pain         Disposition:  Discharge  11/1/2024  6:08 pm    Follow-up:  Behery, Omar Atef, MD  [address] [place] [id number] [phone]    Schedule an appointment as soon as possible for a visit            Medications Prescribed:  Current Discharge Medication List        START taking these medications    Details   Meloxicam (MOBIC) 15 MG Oral Tab Take 1 tablet (15 mg total) by mouth daily for 14 days.  Qty: 14 tablet, Refills: 0                 Supplementary Documentation:

## 2024-12-26 ENCOUNTER — HOSPITAL ENCOUNTER (OUTPATIENT)
Dept: ULTRASOUND IMAGING | Facility: HOSPITAL | Age: 49
Discharge: HOME | End: 2024-12-26
Attending: ORTHOPAEDIC SURGERY
Payer: COMMERCIAL

## 2024-12-26 ENCOUNTER — HOSPITAL ENCOUNTER (EMERGENCY)
Facility: HOSPITAL | Age: 49
Discharge: HOME | End: 2024-12-26
Attending: EMERGENCY MEDICINE
Payer: COMMERCIAL

## 2024-12-26 VITALS
BODY MASS INDEX: 27.88 KG/M2 | OXYGEN SATURATION: 96 % | WEIGHT: 142 LBS | SYSTOLIC BLOOD PRESSURE: 131 MMHG | RESPIRATION RATE: 18 BRPM | HEIGHT: 60 IN | DIASTOLIC BLOOD PRESSURE: 83 MMHG | HEART RATE: 81 BPM

## 2024-12-26 DIAGNOSIS — I82.411 ACUTE DEEP VEIN THROMBOSIS (DVT) OF FEMORAL VEIN OF RIGHT LOWER EXTREMITY (HCC): Primary | ICD-10-CM

## 2024-12-26 DIAGNOSIS — M79.661 PAIN OF RIGHT LOWER LEG: ICD-10-CM

## 2024-12-26 LAB
ALBUMIN SERPL-MCNC: 5.2 G/DL (ref 3.2–4.8)
ALBUMIN/GLOB SERPL: 1.7 {RATIO} (ref 1–2)
ALP LIVER SERPL-CCNC: 102 U/L
ALT SERPL-CCNC: 44 U/L
ANION GAP SERPL CALC-SCNC: 9 MMOL/L (ref 0–18)
AST SERPL-CCNC: 36 U/L (ref ?–34)
BASOPHILS # BLD AUTO: 0.02 X10(3) UL (ref 0–0.2)
BASOPHILS NFR BLD AUTO: 0.3 %
BILIRUB SERPL-MCNC: 0.4 MG/DL (ref 0.3–1.2)
BUN BLD-MCNC: 13 MG/DL (ref 9–23)
BUN/CREAT SERPL: 16 (ref 10–20)
CALCIUM BLD-MCNC: 10.6 MG/DL (ref 8.7–10.4)
CHLORIDE SERPL-SCNC: 106 MMOL/L (ref 98–112)
CO2 SERPL-SCNC: 28 MMOL/L (ref 21–32)
CREAT BLD-MCNC: 0.81 MG/DL
DEPRECATED RDW RBC AUTO: 46.5 FL (ref 35.1–46.3)
EGFRCR SERPLBLD CKD-EPI 2021: 89 ML/MIN/1.73M2 (ref 60–?)
EOSINOPHIL # BLD AUTO: 0.08 X10(3) UL (ref 0–0.7)
EOSINOPHIL NFR BLD AUTO: 1.2 %
ERYTHROCYTE [DISTWIDTH] IN BLOOD BY AUTOMATED COUNT: 13.4 % (ref 11–15)
GLOBULIN PLAS-MCNC: 3 G/DL (ref 2–3.5)
GLUCOSE BLD-MCNC: 114 MG/DL (ref 70–99)
HCT VFR BLD AUTO: 41.3 %
HGB BLD-MCNC: 13.6 G/DL
IMM GRANULOCYTES # BLD AUTO: 0.03 X10(3) UL (ref 0–1)
IMM GRANULOCYTES NFR BLD: 0.4 %
LYMPHOCYTES # BLD AUTO: 1.24 X10(3) UL (ref 1–4)
LYMPHOCYTES NFR BLD AUTO: 17.9 %
MCH RBC QN AUTO: 31.1 PG (ref 26–34)
MCHC RBC AUTO-ENTMCNC: 32.9 G/DL (ref 31–37)
MCV RBC AUTO: 94.3 FL
MONOCYTES # BLD AUTO: 0.35 X10(3) UL (ref 0.1–1)
MONOCYTES NFR BLD AUTO: 5.1 %
NEUTROPHILS # BLD AUTO: 5.2 X10 (3) UL (ref 1.5–7.7)
NEUTROPHILS # BLD AUTO: 5.2 X10(3) UL (ref 1.5–7.7)
NEUTROPHILS NFR BLD AUTO: 75.1 %
OSMOLALITY SERPL CALC.SUM OF ELEC: 297 MOSM/KG (ref 275–295)
PLATELET # BLD AUTO: 189 10(3)UL (ref 150–450)
POTASSIUM SERPL-SCNC: 3.7 MMOL/L (ref 3.5–5.1)
PROT SERPL-MCNC: 8.2 G/DL (ref 5.7–8.2)
RBC # BLD AUTO: 4.38 X10(6)UL
SODIUM SERPL-SCNC: 143 MMOL/L (ref 136–145)
WBC # BLD AUTO: 6.9 X10(3) UL (ref 4–11)

## 2024-12-26 PROCEDURE — 85025 COMPLETE CBC W/AUTO DIFF WBC: CPT | Performed by: EMERGENCY MEDICINE

## 2024-12-26 PROCEDURE — 36415 COLL VENOUS BLD VENIPUNCTURE: CPT

## 2024-12-26 PROCEDURE — 80053 COMPREHEN METABOLIC PANEL: CPT

## 2024-12-26 PROCEDURE — 85025 COMPLETE CBC W/AUTO DIFF WBC: CPT

## 2024-12-26 PROCEDURE — 99284 EMERGENCY DEPT VISIT MOD MDM: CPT

## 2024-12-26 PROCEDURE — 99283 EMERGENCY DEPT VISIT LOW MDM: CPT

## 2024-12-26 PROCEDURE — 80053 COMPREHEN METABOLIC PANEL: CPT | Performed by: EMERGENCY MEDICINE

## 2024-12-26 PROCEDURE — 93971 EXTREMITY STUDY: CPT | Performed by: ORTHOPAEDIC SURGERY

## 2024-12-26 RX ORDER — APIXABAN 2.5 MG/1
TABLET, FILM COATED ORAL
COMMUNITY
Start: 2024-12-12

## 2024-12-27 NOTE — ED PROVIDER NOTES
Patient Seen in: [facility] Emergency Department    History     Chief Complaint   Patient presents with    Deep Vein Thrombosis       HPI    Patient presents to the ED complaining of a DVT in her right leg.  She had a hamstring injury recently and had surgery on 12/12/2024.  Restarted on Eliquis 2.5 mg daily at that time.  She states history of multiple blood clots in the past, was on anticoagulation until menopause around age 35.  She denies other complaints.  No chest pain or shortness of breath.    History reviewed. History reviewed. No pertinent past medical history.    History reviewed. History reviewed. No pertinent surgical history.      Medications :  Prescriptions Prior to Admission[1]     No family history on file.    Smoking Status:   Social History     Socioeconomic History    Marital status: Single   Tobacco Use    Smoking status: Never    Smokeless tobacco: Never   Vaping Use    Vaping status: Never Used   Substance and Sexual Activity    Alcohol use: Never    Drug use: Never       Constitutional and vital signs reviewed.      Social History and Family History elements reviewed from today, pertinent positives to the presenting problem noted.    Physical Exam     ED Triage Vitals [12/26/24 1941]   /88   Pulse 110   Resp 18   Temp    Temp src    SpO2 100 %   O2 Device None (Room air)       All measures to prevent infection transmission during my interaction with the patient were taken. Handwashing was performed prior to and after the exam.  Stethoscope and any equipment used during my examination was cleaned with super sani-cloth germicidal wipes following the exam.     Physical Exam  Vitals and nursing note reviewed.   Constitutional:       General: She is not in acute distress.     Appearance: She is well-developed.   HENT:      Head: Normocephalic and atraumatic.   Eyes:      General:         Right eye: No discharge.         Left eye: No discharge.      Conjunctiva/sclera: Conjunctivae  normal.   Neck:      Trachea: No tracheal deviation.   Cardiovascular:      Rate and Rhythm: Normal rate and regular rhythm.      Heart sounds: Normal heart sounds.   Pulmonary:      Effort: Pulmonary effort is normal. No respiratory distress.      Breath sounds: No stridor.   Abdominal:      General: There is no distension.      Palpations: Abdomen is soft.   Musculoskeletal:         General: No tenderness or deformity.   Skin:     General: Skin is warm and dry.   Neurological:      Mental Status: She is alert and oriented to person, place, and time.   Psychiatric:         Mood and Affect: Mood normal.         Behavior: Behavior normal.         ED Course        Labs Reviewed   CBC WITH DIFFERENTIAL WITH PLATELET - Abnormal; Notable for the following components:       Result Value    RDW-SD 46.5 (*)     All other components within normal limits   COMP METABOLIC PANEL (14) - Abnormal; Notable for the following components:    Glucose 114 (*)     Calcium, Total 10.6 (*)     Calculated Osmolality 297 (*)     AST 36 (*)     Alkaline Phosphatase 102 (*)     Albumin 5.2 (*)     All other components within normal limits   RAINBOW DRAW LAVENDER   RAINBOW DRAW LIGHT GREEN   RAINBOW DRAW BLUE       As Interpreted by me    Imaging Results Available and Reviewed while in ED: US VENOUS DOPPLER LEG RIGHT - DIAG IMG (CPT=93971)    Result Date: 12/26/2024  CONCLUSION:  1. Acute nonocclusive DVT in right popliteal vein and distal thigh segment of femoral vein. 2. Ultrasound technologist was instructed to call the results to Dr. Garcia    Dictated by (CST): Cristian Perea MD on 12/26/2024 at 6:49 PM     Finalized by (CST): Cristian Perea MD on 12/26/2024 at 6:52 PM         ED Medications Administered:   Medications   apixaban (Eliquis) tab 10 mg (10 mg Oral Given 12/26/24 2326)         MDM     Vitals:    12/26/24 1941 12/26/24 2230   BP: 125/88 131/83   Pulse: 110 81   Resp: 18    SpO2: 100% 96%   Weight: 64.4 kg    Height: 152.4 cm (5')       *I personally reviewed and interpreted all ED vitals.    Pulse Ox: 96%, Room air, Normal     Monitor Interpretation:   normal sinus rhythm as interpreted by me.  The cardiac monitor was ordered to monitor heart rate.    Differential Diagnosis/ Diagnostic Considerations: DVT, other    Complicating Factors: The patient already has does not have a problem list on file. to contribute to the complexity of this ED evaluation.    Medical Decision Making  Patient presents to the ED for nonocclusive DVTs in her right popliteal vein and right femoral vein.  She is on 2.5 mg Eliquis twice daily.  No concern for PE.  Will increase the patient to full treatment dose of Eliquis.  Recommended hematology follow-up given history of past clotting and patient will likely need lifelong anticoagulation.  Return precautions discussed.  Patient will follow-up for repeat ultrasound in several weeks.    Problems Addressed:  Acute deep vein thrombosis (DVT) of femoral vein of right lower extremity (HCC): acute illness or injury    Amount and/or Complexity of Data Reviewed  Labs: ordered. Decision-making details documented in ED Course.    Risk  Prescription drug management.        Condition upon leaving the department: Stable    Disposition and Plan     Clinical Impression:  1. Acute deep vein thrombosis (DVT) of femoral vein of right lower extremity (HCC)        Disposition:  Discharge    Follow-up:  Krishna Carpenter MD  [address] [facility] [id number] [phone]    Schedule an appointment as soon as possible for a visit in 2 week(s)        Medications Prescribed:  Discharge Medication List as of 12/26/2024 11:29 PM        START taking these medications    Details   !! apixaban 5 MG Oral Tab Take 2 tablets (10 mg total) by mouth 2 (two) times daily for 7 days, THEN 1 tablet (5 mg total) 2 (two) times daily for 23 days., Normal, Disp-74 tablet, R-0       !! - Potential duplicate medications found. Please discuss with provider.                            [1] (Not in a hospital admission)

## 2024-12-27 NOTE — ED INITIAL ASSESSMENT (HPI)
Pt arrives to triage with c/o recent hamstring surgery 12/12/2024. Saw MD this morning and Dx with DVT in Right calf.    Painful and warm to touch.  Pt is on eliquis    US results  CONCLUSION:   1. Acute nonocclusive DVT in right popliteal vein and distal thigh segment of femoral vein.   2. Ultrasound technologist was instructed to call the results to Dr. Garcia

## 2024-12-27 NOTE — DISCHARGE INSTRUCTIONS
Start full dose treatment with Eliquis.  10 mg twice a day for 7 days followed by 5 mg twice daily for 3 months.  Please follow-up with hematology for further evaluation.  It is recommended that you take half dose blood thinners for the rest of your life.

## (undated) NOTE — LETTER
Date & Time: 11/1/2024, 6:14 PM  Patient: Sheryl Maloney  Encounter Provider(s):    Martin Duarte MD       To Whom It May Concern:    Sheryl Maloney was seen and treated in our department on 11/1/2024. She is excused from work on 11/2/24    If you have any questions or concerns, please do not hesitate to call.        _____________________________  Physician/APC Signature

## (undated) NOTE — LETTER
Date & Time: 10/31/2024, 11:55 AM  Patient: Sheryl Maloney  Encounter Provider(s):    Abbi Iverson PA-C       To Whom It May Concern:    Sheryl Maloney was seen and treated in our department on 10/31/2024. She can return to work 11/4/2024.    If you have any questions or concerns, please do not hesitate to call.        _____________________________  Physician/APC Signature